# Patient Record
Sex: MALE | Race: WHITE | NOT HISPANIC OR LATINO
[De-identification: names, ages, dates, MRNs, and addresses within clinical notes are randomized per-mention and may not be internally consistent; named-entity substitution may affect disease eponyms.]

---

## 2019-12-06 ENCOUNTER — TRANSCRIPTION ENCOUNTER (OUTPATIENT)
Age: 74
End: 2019-12-06

## 2022-11-21 ENCOUNTER — APPOINTMENT (OUTPATIENT)
Dept: PULMONOLOGY | Facility: CLINIC | Age: 77
End: 2022-11-21

## 2022-11-21 VITALS
WEIGHT: 155 LBS | BODY MASS INDEX: 20.99 KG/M2 | HEIGHT: 72 IN | OXYGEN SATURATION: 98 % | HEART RATE: 68 BPM | SYSTOLIC BLOOD PRESSURE: 122 MMHG | DIASTOLIC BLOOD PRESSURE: 78 MMHG | RESPIRATION RATE: 16 BRPM

## 2022-11-21 DIAGNOSIS — Z86.39 PERSONAL HISTORY OF OTHER ENDOCRINE, NUTRITIONAL AND METABOLIC DISEASE: ICD-10-CM

## 2022-11-21 DIAGNOSIS — Z85.820 PERSONAL HISTORY OF MALIGNANT MELANOMA OF SKIN: ICD-10-CM

## 2022-11-21 DIAGNOSIS — Z87.891 PERSONAL HISTORY OF NICOTINE DEPENDENCE: ICD-10-CM

## 2022-11-21 DIAGNOSIS — Z86.79 PERSONAL HISTORY OF OTHER DISEASES OF THE CIRCULATORY SYSTEM: ICD-10-CM

## 2022-11-21 PROCEDURE — 99204 OFFICE O/P NEW MOD 45 MIN: CPT

## 2022-11-21 RX ORDER — ATORVASTATIN CALCIUM 10 MG/1
10 TABLET, FILM COATED ORAL
Qty: 90 | Refills: 0 | Status: ACTIVE | COMMUNITY
Start: 2022-11-19

## 2022-11-21 RX ORDER — ALBUTEROL SULFATE 90 UG/1
108 (90 BASE) INHALANT RESPIRATORY (INHALATION)
Qty: 7 | Refills: 0 | Status: ACTIVE | COMMUNITY
Start: 2022-09-26

## 2022-11-21 RX ORDER — ALPRAZOLAM 0.5 MG/1
0.5 TABLET ORAL
Qty: 15 | Refills: 0 | Status: ACTIVE | COMMUNITY
Start: 2022-10-11

## 2022-11-21 RX ORDER — SILDENAFIL 100 MG/1
100 TABLET, FILM COATED ORAL
Qty: 18 | Refills: 0 | Status: ACTIVE | COMMUNITY
Start: 2022-11-10

## 2022-11-21 NOTE — PHYSICAL EXAM
[No Acute Distress] : no acute distress [Normal Appearance] : normal appearance [Normal Rate/Rhythm] : normal rate/rhythm [Normal S1, S2] : normal s1, s2 [No Murmurs] : no murmurs [No Rubs] : no rubs [No Gallops] : no gallops [No Resp Distress] : no resp distress [No Acc Muscle Use] : no acc muscle use [Normal Rhythm and Effort] : normal rhythm and effort [Clear to Auscultation Bilaterally] : clear to auscultation bilaterally [Normal to Percussion] : normal to percussion [No Abnormalities] : no abnormalities [Normal Gait] : normal gait [No Clubbing] : no clubbing [No Cyanosis] : no cyanosis [No Edema] : no edema [FROM] : FROM

## 2022-11-21 NOTE — DISCUSSION/SUMMARY
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

## 2022-11-21 NOTE — CONSULT LETTER
[Dear  ___] : Dear  [unfilled], [Consult Letter:] : I had the pleasure of evaluating your patient, [unfilled]. [Please see my note below.] : Please see my note below. [Sincerely,] : Sincerely, [FreeTextEntry3] : [facility]vin [facility] DO [facility]\par Pulmonary Critical Care\par Director Pulmonary Division\par Medical Director Respiratory Therapy\par [facility]\par \par  [DrHelen  ___] : Dr. TRINIDAD

## 2022-11-21 NOTE — PROCEDURE
[FreeTextEntry1] : CT scan by report 9/22\par no suspicious lung nodules, few areas of mucus plugging

## 2022-11-21 NOTE — HISTORY OF PRESENT ILLNESS
[TextBox_4] : Had abnormal CT calcium scan\par had lung and liver abnormalities\par Seen at Dr Harish Escobar- repeat CT scan at 6 months\par small areas of mucus plugging\par frequent throat clearing\par smoked 15 yrs when younger,quit 40 yrs ago\par given rescue inhaler as needed\par no hx asthma\par worked as \par no exposures at home\par very active, no fever, chill, chest pain\par no sig dyspnea

## 2023-01-06 ENCOUNTER — APPOINTMENT (OUTPATIENT)
Dept: PULMONOLOGY | Facility: CLINIC | Age: 78
End: 2023-01-06

## 2023-02-15 ENCOUNTER — APPOINTMENT (OUTPATIENT)
Dept: PULMONOLOGY | Facility: CLINIC | Age: 78
End: 2023-02-15
Payer: MEDICARE

## 2023-02-15 VITALS
SYSTOLIC BLOOD PRESSURE: 120 MMHG | DIASTOLIC BLOOD PRESSURE: 62 MMHG | RESPIRATION RATE: 14 BRPM | OXYGEN SATURATION: 97 % | HEART RATE: 67 BPM

## 2023-02-15 DIAGNOSIS — R91.8 OTHER NONSPECIFIC ABNORMAL FINDING OF LUNG FIELD: ICD-10-CM

## 2023-02-15 DIAGNOSIS — T17.500A UNSPECIFIED FOREIGN BODY IN BRONCHUS CAUSING ASPHYXIATION, INITIAL ENCOUNTER: ICD-10-CM

## 2023-02-15 PROCEDURE — 85018 HEMOGLOBIN: CPT | Mod: QW

## 2023-02-15 PROCEDURE — 94727 GAS DIL/WSHOT DETER LNG VOL: CPT

## 2023-02-15 PROCEDURE — 99213 OFFICE O/P EST LOW 20 MIN: CPT | Mod: 25

## 2023-02-15 PROCEDURE — 94729 DIFFUSING CAPACITY: CPT

## 2023-02-15 PROCEDURE — 94010 BREATHING CAPACITY TEST: CPT

## 2023-02-15 RX ORDER — DOXYCYCLINE HYCLATE 100 MG/1
100 CAPSULE ORAL
Qty: 10 | Refills: 3 | Status: ACTIVE | COMMUNITY
Start: 2023-02-15 | End: 1900-01-01

## 2023-02-15 NOTE — CONSULT LETTER
[Dear  ___] : Dear  [unfilled], [Consult Letter:] : I had the pleasure of evaluating your patient, [unfilled]. [Please see my note below.] : Please see my note below. [Sincerely,] : Sincerely, [DrHeeln  ___] : Dr. TRINIDAD [FreeTextEntry3] : Irvin [facility] DO [facility]\par Pulmonary Critical Care\par Director Pulmonary Division\par Medical Director Respiratory Therapy\par [facility]\par \par

## 2023-02-15 NOTE — DISCUSSION/SUMMARY
[FreeTextEntry1] : Distant smoker, quit 40 yrs ago\par Small areas of mucus plugging, no suspicious nodules by report- [facility] 9/22\par Chronic throat clearing- no active rhinitis/GERD reported, better with Allegra\par has prn rescue given by Shawn, rarely uses\par PFTs are normal\par prn Mucinex/abx\par Vaccinations reviewed, up to date per pt\par 6-8  months or sooner if needed\par

## 2023-02-15 NOTE — HISTORY OF PRESENT ILLNESS
[TextBox_4] : Had abnormal CT calcium scan\par had lung and liver abnormalities\par Seen at Dr Harish Escobar- repeat CT scan at 6 months\par small areas of mucus plugging\par frequent throat clearing\par smoked 15 yrs when younger,quit 40 yrs ago\par given rescue inhaler as needed\par no hx asthma\par worked as \par no exposures at home\par very active, no fever, chill, chest pain\par no sig dyspnea\par \par 2/15/23\par intermittent congestion\par some improvement on Allegra\par no fever, chest pain\par no dyspnea

## 2023-04-06 NOTE — QUALITY MEASURES
Name of procedure: Liver lesion biopsy    Sedation medications given:     Versed: 3 mg IV    Fentanyl: 50 mcg IV    Sedation tolerated: well    Sedation start:  1055  Sedation end:  1110    Vital Signs:stable    Samples sent to lab:Cytology present    Any complications related to procedure:  none  Post Procedure Care Needed/order sets placed in connect care.  yes [Patient unable to perform] : patient is unable to perform spirometry

## 2023-11-25 ENCOUNTER — EMERGENCY (EMERGENCY)
Facility: HOSPITAL | Age: 78
LOS: 1 days | Discharge: HOME | End: 2023-11-25
Attending: EMERGENCY MEDICINE
Payer: MEDICARE

## 2023-11-25 VITALS
DIASTOLIC BLOOD PRESSURE: 91 MMHG | RESPIRATION RATE: 18 BRPM | SYSTOLIC BLOOD PRESSURE: 181 MMHG | HEIGHT: 72 IN | OXYGEN SATURATION: 100 % | WEIGHT: 160.06 LBS | HEART RATE: 87 BPM | TEMPERATURE: 98 F

## 2023-11-25 PROCEDURE — 90715 TDAP VACCINE 7 YRS/> IM: CPT

## 2023-11-25 PROCEDURE — 99284 EMERGENCY DEPT VISIT MOD MDM: CPT | Mod: GC

## 2023-11-25 PROCEDURE — 90471 IMMUNIZATION ADMIN: CPT

## 2023-11-25 PROCEDURE — 99283 EMERGENCY DEPT VISIT LOW MDM: CPT | Mod: 25

## 2023-11-25 RX ORDER — TETANUS TOXOID, REDUCED DIPHTHERIA TOXOID AND ACELLULAR PERTUSSIS VACCINE, ADSORBED 5; 2.5; 8; 8; 2.5 [IU]/.5ML; [IU]/.5ML; UG/.5ML; UG/.5ML; UG/.5ML
0.5 SUSPENSION INTRAMUSCULAR ONCE
Refills: 0 | Status: COMPLETED | OUTPATIENT
Start: 2023-11-25 | End: 2023-11-25

## 2023-11-25 RX ADMIN — Medication 1 TABLET(S): at 13:39

## 2023-11-25 RX ADMIN — TETANUS TOXOID, REDUCED DIPHTHERIA TOXOID AND ACELLULAR PERTUSSIS VACCINE, ADSORBED 0.5 MILLILITER(S): 5; 2.5; 8; 8; 2.5 SUSPENSION INTRAMUSCULAR at 13:39

## 2023-11-25 NOTE — ED PROVIDER NOTE - NSFOLLOWUPINSTRUCTIONS_ED_ALL_ED_FT
- SEEK IMMEDIATE MEDICAL CARE IF YOU HAVE ANY OF THE FOLLOWING SYMPTOMS: red streaking away from the wound, fluid/blood/pus coming from the wound, fever or chills, trouble moving the injured area, numbness or tingling extending beyond the wound.  - Take antibiotics as prescribed.   - Dressing changes once daily. Follow-up with primary care doctor in 5 days.     Animal Bite    Animal bites can range from mild to serious. An animal bite can result in a scratch on the skin, a deep open cut, a puncture of the skin, a crush injury, or tearing away of the skin or a body part. Treatment includes wound care, updating your tetanus shot, and in some cases, administering a rabies vaccine. If you were prescribed an antibiotic, take it as told by your health care provider. Do not stop using the antibiotic even if your condition improves.

## 2023-11-25 NOTE — ED ADULT NURSE NOTE - NSFALLUNIVINTERV_ED_ALL_ED
Bed/Stretcher in lowest position, wheels locked, appropriate side rails in place/Call bell, personal items and telephone in reach/Instruct patient to call for assistance before getting out of bed/chair/stretcher/Non-slip footwear applied when patient is off stretcher/[place] to call system/Physically safe environment - no spills, clutter or unnecessary equipment/Purposeful proactive rounding/Room/bathroom lighting operational, light cord in reach

## 2023-11-25 NOTE — ED ADULT TRIAGE NOTE - CHIEF COMPLAINT QUOTE
Pt BIBA c/o bite to left lower calf by neighbors dog.  Large bite noted to LLE; bleeding controlled by gauze.

## 2023-11-25 NOTE — ED PROVIDER NOTE - PATIENT PORTAL LINK FT
You can access the FollowMyHealth Patient Portal offered by [facility] by registering at the following website: [email or web address]. By joining [facility]’s FollowMyHealth portal, you will also be able to view your health information using other applications (apps) compatible with our system.

## 2023-11-25 NOTE — ED ADULT NURSE NOTE - OBJECTIVE STATEMENT
Assumed care of pt in ST. Pt A&Ox4 c/o dog bite to the left leg. Pt denies CP and SOB. RR even and unlabored.

## 2023-11-25 NOTE — ED PROVIDER NOTE - CLINICAL SUMMARY MEDICAL DECISION MAKING FREE TEXT BOX
78-year-old male with history of HLD presenting after dog bite.  Tetanus and Augmentin given. No indication for rabies ppx. Would cleansed with 2.5L and dressed with Xeroform and gauze. Wrapped in Kelex. Would care instructions given and antibiotics sent. Strict return precautions given.

## 2023-11-25 NOTE — ED PROVIDER NOTE - OBJECTIVE STATEMENT
78-year-old male with history of HLD presenting after dog bite.  Patient was bitten on the left medial lower leg by the neighbors dog who is fully vaccinated.  Patient is unsure as was his last tetanus was.  Dog bite occurred today.  Only medication at this time.  No leg numbness, weakness.  Patient is able to bear weight on the leg.  Bleeding controlled at triage. Of note patient's wife was also bitten by safe dog yesterday.

## 2023-11-25 NOTE — ED PROVIDER NOTE - PHYSICAL EXAMINATION
Gen: no acute distress  Head: normocephalic, atraumatic  EENT: EOMI  Lung: no increased work of breathing  CV:no LE edema  MSK: 6cm x5cm irregular avulsion of skin exposing adipose tissue. No musle or bone exposed. No arterial bleed; pulses intact in LLE  Neuro: A&Ox4; No focal neurologic deficits

## 2024-03-19 PROBLEM — Z78.9 OTHER SPECIFIED HEALTH STATUS: Chronic | Status: ACTIVE | Noted: 2023-11-25

## 2024-07-30 ENCOUNTER — APPOINTMENT (OUTPATIENT)
Dept: NEUROLOGY | Facility: CLINIC | Age: 79
End: 2024-07-30

## 2024-08-28 ENCOUNTER — APPOINTMENT (OUTPATIENT)
Dept: ORTHOPEDIC SURGERY | Facility: CLINIC | Age: 79
End: 2024-08-28
Payer: MEDICARE

## 2024-08-28 VITALS — BODY MASS INDEX: 20.99 KG/M2 | HEIGHT: 72 IN | WEIGHT: 155 LBS

## 2024-08-28 DIAGNOSIS — M51.36 OTHER INTERVERTEBRAL DISC DEGENERATION, LUMBAR REGION: ICD-10-CM

## 2024-08-28 PROCEDURE — 99203 OFFICE O/P NEW LOW 30 MIN: CPT

## 2024-08-28 PROCEDURE — 72100 X-RAY EXAM L-S SPINE 2/3 VWS: CPT

## 2024-08-28 NOTE — HISTORY OF PRESENT ILLNESS
[de-identified] : 08/28/2024 - Patient presents to the office today for evaluation of right side of low back pain. He reports 12 week history of right sided lower back and flank pain without any prior known trauma or injury. Reports intermittent history of back pain over many years often manage with activity, modifications, and physical therapy. Not currently experiencing any numbness, tingling weakness or sciatic symptoms. No changes in bowel or bladder function. He does have a history of ED.  Patient wants evaluated by primary care with ultrasound, which was negative for Renal pathology  The patient is a 78 year old male who presents today complaining of low back pain, right side Date of Injury/Onset: 10 weeks Pain:    At Rest: 6/10 With Activity:  2-3/10 Mechanism of injury: lifting a heavy garbage can Quality of symptoms: achy Improves with: mild activity, Naproxen Worse with: lying down, bending Prior treatment: heat, ice, Naproxen Prior Imaging: xray of Thoracic at [facility] Additional Information: None

## 2024-08-28 NOTE — DATA REVIEWED
[FreeTextEntry1] : On my interpretation of these images  Thoracic X-ray ZP Mild degenerative changes   Renal bladder US  ZP Left renal parapelvic cyst  Mild prostate hypertrophy No evidence of hydronephrosis  X-ray in office 08/28/2024  Multilevel lumbar degeneration. Mild lumbar degenerative scoliosis. Retro listhesis L4/5  I stop paperwork reviewed

## 2024-08-28 NOTE — PHYSICAL EXAM
[Normal Coordination] : normal coordination [Normal DTR UE/LE] : normal DTR UE/LE  [Normal Sensation] : normal sensation [Normal Mood and Affect] : normal mood and affect [Oriented] : oriented [Able to Communicate] : able to communicate [Normal Skin] : normal skin [No Rash] : no rash [No Ulcers] : no ulcers [No Lesions] : no lesions [No obvious lymphadenopathy in areas examined] : no obvious lymphadenopathy in areas examined [Well Developed] : well developed [Peripheral vascular exam is grossly normal] : peripheral vascular exam is grossly normal [No Respiratory Distress] : no respiratory distress [] : clonus not sustained at ankle [FreeTextEntry9] : Positive provocative maneuvers for QL stretching along the right posterior iliac crest. Painful restricted lumbar range of motion

## 2024-09-30 ENCOUNTER — APPOINTMENT (OUTPATIENT)
Dept: ORTHOPEDIC SURGERY | Facility: CLINIC | Age: 79
End: 2024-09-30
Payer: MEDICARE

## 2024-09-30 VITALS — BODY MASS INDEX: 22.35 KG/M2 | WEIGHT: 165 LBS | HEIGHT: 72 IN

## 2024-09-30 DIAGNOSIS — M79.10 MYALGIA, UNSPECIFIED SITE: ICD-10-CM

## 2024-09-30 DIAGNOSIS — M51.36 OTHER INTERVERTEBRAL DISC DEGENERATION, LUMBAR REGION: ICD-10-CM

## 2024-09-30 PROCEDURE — 99213 OFFICE O/P EST LOW 20 MIN: CPT | Mod: 25

## 2024-09-30 PROCEDURE — 20552 NJX 1/MLT TRIGGER POINT 1/2: CPT

## 2024-10-01 NOTE — DATA REVIEWED
[FreeTextEntry1] : On my interpretation of these images  Thoracic X-ray ZP Mild degenerative changes   Renal bladder US  ZP Left renal parapelvic cyst  Mild prostate hypertrophy No evidence of hydronephrosis  X-ray in office 08/28/2024  Multilevel lumbar degeneration. Mild lumbar degenerative scoliosis. Retro listhesis L4/5  I stop paperwork reviewed PT progress notes reviewed

## 2024-10-01 NOTE — DISCUSSION/SUMMARY
[de-identified] : 78 Y M with lumbar myalgia. He will continue to use medication OTC as tolerated. Physical therapy and home exercises to focus on poor muscle strengthening range of motion and Quadratus lumborum stretching. MRI request if symptoms do not improve from conservative treatment. F/u 4 weeks.   Today in office as part of ongoing treatment a trigger point injection is administered as medical necessity into right lumbar multifidis and longissimus paraspinal musculature to facilitate ROM and stretching. Injection consisted of 1cc Kenalog 40 and 4cc .25% Marcaine.   Prior to appointment and during encounter with patient extensive medical records were reviewed including but not limited to, hospital records, outpatient records, imaging results, and lab data. During this appointment the patient was examined, diagnoses were discussed and explained in a face to face manner. In addition extensive time was spent reviewing aforementioned diagnostic studies. Counseling including abnormal image results, differential diagnoses, treatment options, risk and benefits, lifestyle changes, current condition, and current medications was performed. Patient's comments, questions, and concerns were addressed and patient verbalized understanding. Based on this patient's presentation at our office, which is an orthopedic spine surgeon's office, this patient inherently / intrinsically has a risk, however minute, of developing issues such as Cauda equina syndrome, bowel and bladder changes, or progression of motor or neurological deficits such as paralysis which may be permanent.  ANUSHKA GONZALEZ Acting as a Scribe for Dr. Aaron AUSTIN, Anushka Gonzalez, attest that this documentation has been prepared under the direction and in the presence of Provider Gilberto Walker MD.

## 2024-10-01 NOTE — HISTORY OF PRESENT ILLNESS
[Lower back] : lower back [7] : 7 [5] : 5 [Dull/Aching] : dull/aching [Radiating] : radiating [Tightness] : tightness [Constant] : constant [Household chores] : household chores [Leisure] : leisure [Rest] : rest [Lying in bed] : lying in bed [Physical therapy] : physical therapy [Retired] : Work status: retired [de-identified] : 09/30/2024 - Patient presenting in follow up. He complains of right sided back pain. Pain worse with extended standing. He is enrolled in PT but not making as much progress as he would of liked. Takes NSAID at nighttime with short term relief.   08/28/2024 - Patient presents to the office today for evaluation of right side of low back pain. He reports 12 week history of right sided lower back and flank pain without any prior known trauma or injury. Reports intermittent history of back pain over many years often manage with activity, modifications, and physical therapy. Not currently experiencing any numbness, tingling weakness or sciatic symptoms. No changes in bowel or bladder function. He does have a history of ED.  Patient wants evaluated by primary care with ultrasound, which was negative for Renal pathology  The patient is a 78 year old male who presents today complaining of low back pain, right side Date of Injury/Onset: 10 weeks Pain:    At Rest: 6/10 With Activity:  2-3/10 Mechanism of injury: lifting a heavy garbage can Quality of symptoms: achy Improves with: mild activity, Naproxen Worse with: lying down, bending Prior treatment: heat, ice, Naproxen Prior Imaging: [facility] of Thoracic at [facility] Additional Information: None  [] : no [FreeTextEntry7] : across the lower back [de-identified] : xray [facility]

## 2024-10-01 NOTE — DISCUSSION/SUMMARY
[de-identified] : 78 Y M with lumbar myalgia. He will continue to use medication OTC as tolerated. Physical therapy and home exercises to focus on poor muscle strengthening range of motion and Quadratus lumborum stretching. MRI request if symptoms do not improve from conservative treatment. F/u 4 weeks.   Today in office as part of ongoing treatment a trigger point injection is administered as medical necessity into right lumbar multifidis and longissimus paraspinal musculature to facilitate ROM and stretching. Injection consisted of 1cc Kenalog 40 and 4cc .25% Marcaine.   Prior to appointment and during encounter with patient extensive medical records were reviewed including but not limited to, hospital records, outpatient records, imaging results, and lab data. During this appointment the patient was examined, diagnoses were discussed and explained in a face to face manner. In addition extensive time was spent reviewing aforementioned diagnostic studies. Counseling including abnormal image results, differential diagnoses, treatment options, risk and benefits, lifestyle changes, current condition, and current medications was performed. Patient's comments, questions, and concerns were addressed and patient verbalized understanding. Based on this patient's presentation at our office, which is an orthopedic spine surgeon's office, this patient inherently / intrinsically has a risk, however minute, of developing issues such as Cauda equina syndrome, bowel and bladder changes, or progression of motor or neurological deficits such as paralysis which may be permanent.  ANUSHKA GONZALEZ Acting as a Scribe for Dr. Aaron AUSTIN, Anushka Gonzalez, attest that this documentation has been prepared under the direction and in the presence of Provider Gilberto Walker MD.

## 2024-10-01 NOTE — HISTORY OF PRESENT ILLNESS
[Lower back] : lower back [7] : 7 [5] : 5 [Dull/Aching] : dull/aching [Radiating] : radiating [Tightness] : tightness [Constant] : constant [Household chores] : household chores [Leisure] : leisure [Rest] : rest [Lying in bed] : lying in bed [Physical therapy] : physical therapy [Retired] : Work status: retired [de-identified] : 09/30/2024 - Patient presenting in follow up. He complains of right sided back pain. Pain worse with extended standing. He is enrolled in PT but not making as much progress as he would of liked. Takes NSAID at nighttime with short term relief.   08/28/2024 - Patient presents to the office today for evaluation of right side of low back pain. He reports 12 week history of right sided lower back and flank pain without any prior known trauma or injury. Reports intermittent history of back pain over many years often manage with activity, modifications, and physical therapy. Not currently experiencing any numbness, tingling weakness or sciatic symptoms. No changes in bowel or bladder function. He does have a history of ED.  Patient wants evaluated by primary care with ultrasound, which was negative for Renal pathology  The patient is a 78 year old male who presents today complaining of low back pain, right side Date of Injury/Onset: 10 weeks Pain:    At Rest: 6/10 With Activity:  2-3/10 Mechanism of injury: lifting a heavy garbage can Quality of symptoms: achy Improves with: mild activity, Naproxen Worse with: lying down, bending Prior treatment: heat, ice, Naproxen Prior Imaging: xray of Thoracic at [facility] Additional Information: None  [] : no [FreeTextEntry7] : across the lower back [de-identified] : xray [facility]

## 2024-10-28 ENCOUNTER — APPOINTMENT (OUTPATIENT)
Dept: ORTHOPEDIC SURGERY | Facility: CLINIC | Age: 79
End: 2024-10-28

## 2025-06-09 ENCOUNTER — APPOINTMENT (OUTPATIENT)
Dept: ORTHOPEDIC SURGERY | Facility: CLINIC | Age: 80
End: 2025-06-09
Payer: MEDICARE

## 2025-06-09 VITALS — WEIGHT: 160 LBS | HEIGHT: 72 IN | BODY MASS INDEX: 21.67 KG/M2

## 2025-06-09 PROCEDURE — 99213 OFFICE O/P EST LOW 20 MIN: CPT

## 2025-06-09 PROCEDURE — 73030 X-RAY EXAM OF SHOULDER: CPT | Mod: LT

## 2025-06-09 RX ORDER — FAMOTIDINE 10 MG/1
TABLET, FILM COATED ORAL
Refills: 0 | Status: ACTIVE | COMMUNITY